# Patient Record
(demographics unavailable — no encounter records)

---

## 2024-10-14 NOTE — PHYSICAL EXAM
[Chaperone Present] : A chaperone was present in the examining room during all aspects of the physical examination [No Lymphadenopathy] : no lymphadenopathy [Soft] : soft [Non-tender] : non-tender [No HSM] : No HSM [No Mass] : no mass [87486] : A chaperone was present during the pelvic exam. [FreeTextEntry3] : No thyroid nodules [Examination Of The Breasts] : a normal appearance [No Masses] : no breast masses were palpable [Labia Majora] : normal [Labia Minora] : normal [Normal] : normal [Tenderness] : nontender [Enlarged ___ wks] : not enlarged [Uterine Adnexae] : normal [Nl Sphincter Tone] : normal sphincter tone [FreeTextEntry4] : introitus is not lax; bladder neck descends a small amt w/ Valsalva. Overall, good support w/ good levator strength. [FreeTextEntry5] : well-supported [FreeTextEntry6] : axial [FreeTextEntry9] : No masses

## 2024-10-14 NOTE — HISTORY OF PRESENT ILLNESS
[TextBox_4] :  (2019 - NVD - female) LMP 2-3 wks ago, for gynecologic care.  Patient reported being sexually active but not using birth control when seen on 2023. Feels well. No gyn complaints. Normal bowel function. USI since delivery in 2019. Wears a pad. Has not been doing Kegels. No UTI sx's. Voids 1-2 x/nt depending on fluid intake. Wants an STD screening. 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 [Mammogramdate] : 1/5/2023 [TextBox_19] : Scattered fibroglandular with no evidence of malignancy. [PapSmeardate] : 4/20/2023 [TextBox_31] : neg; HPV neg [Regular Cycle Intervals] : periods have been regular [Frequency: Q ___ days] : menstrual periods occur approximately every [unfilled] days [Previously active] : previously active [Men] : men [Vaginal] : vaginal [No] : No

## 2024-10-22 NOTE — HISTORY OF PRESENT ILLNESS
[FreeTextEntry1] : Inez Martinez 42 year old woman with history of hld is here in the sleep center to address sleep apnea.  Patient is sleepy with [place] sleepiness score of 12.  Patient has very loud snoring, does not have any witnessed apneas.  Patient's bedtime is around 8 PM wakes up in the morning around 5.30 AM.   She feels tired when she wakes up.  Patient drinks 1 cup of coffee during the daytime. Patient also has headaches, has nocturia about 2 times. She is sleepy while driving sometimes. STOPBANG score - 3 neck size - 15 inches

## 2024-10-22 NOTE — ASSESSMENT
[FreeTextEntry1] : Assessment:  Suspected Obstructive Sleep Apnea: Based on symptoms of loud snoring, daytime sleepiness, and nocturia. Headaches: Possible tension-type headaches related to sleep disturbances or nocturnal hypoxia. Nocturia: May be associated with sleep disturbances or underlying conditions. Plan:  Recommend a diagnostic polysomnography to evaluate for obstructive sleep apnea and assess sleep architecture.  Encourage weight management and regular exercise,  to help improve sleep quality. Discuss sleep hygiene practices, including maintaining a consistent sleep schedule.  Schedule follow-up appointment after the sleep

## 2024-10-29 NOTE — HISTORY OF PRESENT ILLNESS
[Home] : at home, [unfilled] , at the time of the visit. [Medical Office: ([facility])___] : at the medical office located in  [Verbal consent obtained from patient] : the patient, [unfilled] [FreeTextEntry1] : 42F PMH class III obesity, prediabetes, low HDL, elevated trig, HLD, MELLO, GERD, seizures, anemia, migraines, who presents to weight management for follow-up.  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 Engaged in numerous weight loss interventions without sustained success. Diet noted for some refined carbohydrates (cereals, chips, white rice, soda) and high-fat foods (chips, take-out), and notes worsened diet (incl fast foods) on weekends. Comparatively sedentary at the time of presentation due to time/energy as a single parent to a young child. Has MELLO, non-adherent but follow-up scheduled.  We discussed lifestyle and dietary interventions. She was prescribed Wegovy.   Weight today: 222 lbs, BMI 39.33 Weight at last visit (9/4/24): 226 lbs, BMI 40.3 Weight (7/26/24): no weight checked Weight at Initial Visit (6/24/24): 235 lbs 6 oz, BMI 41.7, BF 48.6% Goal 160 lbs  Medication: She is taking Wegovy 1 mg/wk x3 weeks, some fatigue,   Diet: Finds she's eating less. Tries to eat 3 meals per day, sometimes skips lunch because of work. Sometimes snacks on fruit or cereal.    Exercise: Walking most days, 15-20 min with daughter. Has kettlebells, dumbbells downstairs, notes no barrier.  (Trying to walk daily, 45 min around a track. Never got a walking pad. Yoga 3x/wk previously, less recently.    Sleep:  (Has MELLO, awaiting appointment for CPAP

## 2024-10-29 NOTE — ASSESSMENT
[FreeTextEntry1] : 42F PMH class III obesity, prediabetes, low HDL, elevated trig, HLD, MELLO, GERD, seizures, anemia, migraines, who presents to weight management for follow-up.  # Metabolic syndrome (class III obesity - central, prediabetes, low HDL, elevated trig) c/b HLD, MELLO, GERD, seizures/migraines: Weight today 222 lbs, BMI 39.33, has lost 4 lbs since last visit ~8 weeks ago, and 13.5 lbs since initial visit. Doing well on Wegovy 1 mg with minimal adverse effects. Eating 3 meals per day, but less overall, sometimes snacks of fruit, refined carb (ie cereal). Exercise is limited, is walking, setting goals for exercise.  - Food log - Meal planning/prep, pablo lunch - limit refined snacks, fast food. - Dietician referral - Setting goals to incorporate exercise, including home workouts (ie kettlebell 1x/wk, resistance bands, etc) - F/u with sleep specialist - Increase Wegovy to 1.7 mg/wk - F/u in 4-8 weeks   W/Z... Qs/top - seiz/migr

## 2024-10-29 NOTE — HISTORY OF PRESENT ILLNESS
[Home] : at home, [unfilled] , at the time of the visit. [Medical Office: ([facility])___] : at the medical office located in  [Verbal consent obtained from patient] : the patient, [unfilled] [FreeTextEntry1] : 42F PMH class III obesity, prediabetes, low HDL, elevated trig, HLD, MELLO, GERD, seizures, anemia, migraines, who presents to weight management for follow-up.  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 Engaged in numerous weight loss interventions without sustained success. Diet noted for some refined carbohydrates (cereals, chips, white rice, soda) and high-fat foods (chips, take-out), and notes worsened diet (incl fast foods) on weekends. Comparatively sedentary at the time of presentation due to time/energy as a single parent to a young child. Has MELLO, non-adherent but follow-up scheduled.  We discussed lifestyle and dietary interventions. She was prescribed Wegovy.   Weight today: 222 lbs, BMI 39.33 Weight at last visit (9/4/24): 226 lbs, BMI 40.3 Weight (7/26/24): no weight checked Weight at Initial Visit (6/24/24): 235 lbs 6 oz, BMI 41.7, BF 48.6% Goal 160 lbs  Medication: She is taking Wegovy 1 mg/wk x3 weeks, some fatigue,   Diet: Finds she's eating less. Tries to eat 3 meals per day, sometimes skips lunch because of work. Sometimes snacks on fruit or cereal.    Exercise: Walking most days, 15-20 min with daughter. Has kettlebells, dumbbells downstairs, notes no barrier.  (Trying to walk daily, 45 min around a track. Never got a walking pad. Yoga 3x/wk previously, less recently.    Sleep:  (Has MELLO, awaiting appointment for CPAP

## 2024-10-29 NOTE — PHYSICAL EXAM
[Obese, well nourished, in no acute distress] : obese, well nourished, in no acute distress [de-identified] : TEB visit

## 2024-10-29 NOTE — PHYSICAL EXAM
[Obese, well nourished, in no acute distress] : obese, well nourished, in no acute distress [de-identified] : TEB visit

## 2024-11-25 NOTE — PHYSICAL EXAM
[General Appearance - Well Developed] : well developed [General Appearance - Well Nourished] : well nourished [General Appearance - In No Acute Distress] : no acute distress [Oriented To Time, Place, And Person] : oriented to person, place, and time [Impaired Insight] : insight and judgment were intact [TextEntry] :  PE limited as today's visit was a video visit.

## 2024-11-25 NOTE — HISTORY OF PRESENT ILLNESS
[Home] : at home, [unfilled] , at the time of the visit. [Medical Office: ([facility])___] : at the medical office located in  [Verbal consent obtained from patient] : the patient, [unfilled] [Date: ___] : Date of most recent diagnostic polysomnogram: [unfilled] [AHI: ___ per hour] : Apnea-hypopnea index:  [unfilled] per hour [Romeo desatuation%: ___] : Romeo desaturation:  [unfilled]% [FreeTextEntry1] : Inez Martinez 42 year old woman with history of hld is here in the sleep center to address sleep apnea.  Patient is sleepy with [place] sleepiness score of 12.  Patient has very loud snoring, does not have any witnessed apneas.  Patient's bedtime is around 8 PM wakes up in the morning around 5.30 AM.   She feels tired when she wakes up.  Patient drinks 1 cup of coffee during the daytime. Patient also has headaches, has nocturia about 2 times. She is sleepy while driving sometimes. STOPBANG score - 3 neck size - 15 inches  11/25/24: Discussed with pt results of PSG completed on 11/15/24 which revealed an AHI of 34.9 events/hr, stefan 77%.  Discussed potential health consequences of untreated sleep apnea including but not limited to HTN, weight gain, fatigue, increased risk of MI, CVA, arrythmias, heart failure and CAD. Pt agreeable to cpap therapy.

## 2025-01-28 NOTE — HISTORY OF PRESENT ILLNESS
[Home] : at home, [unfilled] , at the time of the visit. [Medical Office: ([facility])___] : at the medical office located in  [Verbal consent obtained from patient] : the patient, [unfilled] [Date: ___] : Date of most recent diagnostic polysomnogram: [unfilled] [AHI: ___ per hour] : Apnea-hypopnea index:  [unfilled] per hour [Romeo desatuation%: ___] : Romeo desaturation:  [unfilled]% [FreeTextEntry1] : Inez Martinez 42 year old woman with history of hld is here in the sleep center to address sleep apnea.  Patient is sleepy with [place] sleepiness score of 12.  Patient has very loud snoring, does not have any witnessed apneas.  Patient's bedtime is around 8 PM wakes up in the morning around 5.30 AM.   She feels tired when she wakes up.  Patient drinks 1 cup of coffee during the daytime. Patient also has headaches, has nocturia about 2 times. She is sleepy while driving sometimes. STOPBANG score - 3 neck size - 15 inches  11/25/24: Discussed with pt results of PSG completed on 11/15/24 which revealed an AHI of 34.9 events/hr, stefan 77%.  Discussed potential health consequences of untreated sleep apnea including but not limited to HTN, weight gain, fatigue, increased risk of MI, CVA, arrythmias, heart failure and CAD. Pt agreeable to cpap therapy.   1/28/25: Pt presents for cpap follow-up. Pt is having trouble with mask comfort. Has been having trouble sleeping lately d/t recent life stress. Pt will try to increase usage, f/u in 1 month to reassess compliance.  symptoms on cpap- improvement in excessive daytime sleepiness, improving in snoring  download data machine- resmed airsense 11 mask-full face mask AHI- 3.9 pressure- 5-20cm usage- 3hrs 48 mins DME: [employer]

## 2025-03-03 NOTE — HISTORY OF PRESENT ILLNESS
[Home] : at home, [unfilled] , at the time of the visit. [Medical Office: ([facility])___] : at the medical office located in  [Verbal consent obtained from patient] : the patient, [unfilled] [Date: ___] : Date of most recent diagnostic polysomnogram: [unfilled] [AHI: ___ per hour] : Apnea-hypopnea index:  [unfilled] per hour [Romeo desatuation%: ___] : Romeo desaturation:  [unfilled]% [FreeTextEntry1] : Inez Martinez 42 year old woman with history of hld is here in the sleep center to address sleep apnea.  Patient is sleepy with [place] sleepiness score of 12.  Patient has very loud snoring, does not have any witnessed apneas.  Patient's bedtime is around 8 PM wakes up in the morning around 5.30 AM.   She feels tired when she wakes up.  Patient drinks 1 cup of coffee during the daytime. Patient also has headaches, has nocturia about 2 times. She is sleepy while driving sometimes. STOPBANG score - 3 neck size - 15 inches  11/25/24: Discussed with pt results of PSG completed on 11/15/24 which revealed an AHI of 34.9 events/hr, stefan 77%.  Discussed potential health consequences of untreated sleep apnea including but not limited to HTN, weight gain, fatigue, increased risk of MI, CVA, arrythmias, heart failure and CAD. Pt agreeable to cpap therapy.   1/28/25: Pt presents for cpap follow-up. Pt is having trouble with mask comfort. Has been having trouble sleeping lately d/t recent life stress. Pt will try to increase usage, f/u in 1 month to reassess compliance.  symptoms on cpap- improvement in excessive daytime sleepiness, improving in snoring  download data machine- resmed airsense 11 mask-full face mask AHI- 3.9 pressure- 5-20cm usage- 3hrs 48 mins DME: [employer]  3/3/25: Pt presents for cpap f/u. She is still struggling with cpap, particularly with mask fitting and humidity causing condensation. Pt will increase cpap usage to reach compliance marker, currently at 67%. Pt still motivated to use cpap.   No longer taking Wegovy, felt very fatigued on it. Discussed Zepbound as on option, pt declines at this time and wishes to try losing weight with diet and exercise.   symptoms on cpap- improvement in excessive daytime sleepiness, improving in snoring  download data machine- resmed airsense 11 mask-full face mask (F20 Med) AHI- 3.0 pressure- 5-20cm usage- 5hrs DME: [employer] Set-Up Date: 12/23/24

## 2025-03-10 NOTE — HISTORY OF PRESENT ILLNESS
[de-identified] : 41 yo F with no significant PMH who presents with recurrent RUQ and epigastric pain typically after eating especially greasy foods. Recently, she had associated vomiting due to severity of pain. Pain resolved and she underwent US with Dr. Rea for workup. This showed 2cm gallstone in GB neck. CBD 2mm. Patient is being very careful about what she eats but would like to discuss surgical treatment.

## 2025-03-10 NOTE — PHYSICAL EXAM
[Respiratory Effort] : normal respiratory effort [Normal Rate and Rhythm] : normal rate and rhythm [No Rash or Lesion] : No rash or lesion [Calm] : calm [de-identified] : NAD, well-appearing  [de-identified] : anicteric  [de-identified] : soft, nondistended

## 2025-03-10 NOTE — ASSESSMENT
[FreeTextEntry1] : 43 yo F with recurrent biliary colic   We had a long discussion about the anatomy and pathophysiology of the biliary system. We discussed the manifestation of gallstones including biliary colic, acute cholecystitis, choledocholithiasis, cholangitis and pancreatitis. I have recommended laparoscopic/robotic cholecystectomy. We discussed the risks and benefits including infection, bleeding, injury to vital structures. We also discussed the expected recovery as well as possible side effects of having the gallbladder removed. The patient is in agreement to proceed. Surgery would be performed at [facility] under general anesthesia as an ambulatory procedure.  Tentative plan 3/27/25.

## 2025-03-10 NOTE — CONSULT LETTER
[Dear  ___] : Dear  [unfilled], [( Thank you for referring [unfilled] for consultation for _____ )] : Thank you for referring [unfilled] for consultation for [unfilled] [Please see my note below.] : Please see my note below. [Consult Closing:] : Thank you very much for allowing me to participate in the care of this patient.  If you have any questions, please do not hesitate to contact me. [Sincerely,] : Sincerely, [FreeTextEntry3] : Tessa Roblero MD

## 2025-03-24 NOTE — ASSESSMENT
[FreeTextEntry1] : 42 year  old woman  with sleep apnea is doing well with the CPAP.  Patient is compliant with the CPAP and benefited significantly with the CPAP.

## 2025-03-24 NOTE — HISTORY OF PRESENT ILLNESS
[Date: ___] : Date of most recent diagnostic polysomnogram: [unfilled] [AHI: ___ per hour] : Apnea-hypopnea index:  [unfilled] per hour [Romeo desatuation%: ___] : Romeo desaturation:  [unfilled]% [FreeTextEntry1] : Inez Martinez 42 year old woman with history of htn is here in the sleep center to address sleep apnea.   symptoms prior to cpap - Patient is sleepy with [place] sleepiness score of 12.  Patient has very loud snoring, does not have any witnessed apneas.  Patient's bedtime is around 8 PM wakes up in the morning around 5.30 AM.   She feels tired when she wakes up.  Patient drinks 1 cup of coffee during the daytime. Patient also has headaches, has nocturia about 2 times. She is sleepy while driving sometimes. STOPBANG score - 3 neck size - 15 inches  PSG completed on 11/15/24 which revealed an AHI of 34.9 events/hr, stefan 77%.  download data machine- [employer] airsense 11 mask-full face mask AHI- 2 pressure- 5-20cm usage- 5 hrs DME: [facility]  patient is scheduled for gall bladder surgery in 2 days, asked her to bring cpap to use in post op period.

## 2025-03-24 NOTE — REASON FOR VISIT
[Follow-Up] : a follow-up visit [Sleep Apnea] : sleep apnea [TextEntry] :  This visit is done virtually using both audio and video as per patients request. Patient understood limitations of tele visit and agrees to move forward. patients location - [address] [place] [address] doctors location - [address], [place] identification verified with patients name and date of birth.

## 2025-04-09 NOTE — CONSULT LETTER
[Dear  ___] : Dear  [unfilled], [Courtesy Letter:] : I had the pleasure of seeing your patient, [unfilled], in my office today. [Please see my note below.] : Please see my note below. [Consult Closing:] : Thank you very much for allowing me to participate in the care of this patient.  If you have any questions, please do not hesitate to contact me. [Sincerely,] : Sincerely, [FreeTextEntry1] : Enclosed please find my operative report, pathology report and postop visit note. [FreeTextEntry3] : Tessa Roblero MD

## 2025-04-09 NOTE — ASSESSMENT
[FreeTextEntry1] : 43 yo F s/p robotic cholecystectomy. Recovering appropriately.  Gradual return to normal diet and activity Follow up as needed

## 2025-04-09 NOTE — HISTORY OF PRESENT ILLNESS
[de-identified] : Patient returns for follow up. She is recovering well and has no complaints. She has no pain, fevers, chills. Had diarrhea a couple of days ago because daughter had GI bug also but overall feels well.

## 2025-04-09 NOTE — PHYSICAL EXAM
[Respiratory Effort] : normal respiratory effort [Normal Rate and Rhythm] : normal rate and rhythm [No Rash or Lesion] : No rash or lesion [Calm] : calm [de-identified] : NAD, well-appearing  [de-identified] : anicteric  [de-identified] : soft, nontender, nondistended, well-healing incisions c.d.i